# Patient Record
Sex: FEMALE | Race: WHITE | Employment: UNEMPLOYED | URBAN - METROPOLITAN AREA
[De-identification: names, ages, dates, MRNs, and addresses within clinical notes are randomized per-mention and may not be internally consistent; named-entity substitution may affect disease eponyms.]

---

## 2019-09-13 ENCOUNTER — APPOINTMENT (OUTPATIENT)
Dept: GENERAL RADIOLOGY | Age: 19
End: 2019-09-13
Payer: COMMERCIAL

## 2019-09-13 ENCOUNTER — HOSPITAL ENCOUNTER (EMERGENCY)
Age: 19
Discharge: HOME | End: 2019-09-13
Attending: EMERGENCY MEDICINE
Payer: COMMERCIAL

## 2019-09-13 VITALS
SYSTOLIC BLOOD PRESSURE: 120 MMHG | DIASTOLIC BLOOD PRESSURE: 79 MMHG | RESPIRATION RATE: 16 BRPM | HEART RATE: 95 BPM | TEMPERATURE: 97.7 F | OXYGEN SATURATION: 98 %

## 2019-09-13 DIAGNOSIS — S20.219A CONTUSION OF RIB, UNSPECIFIED LATERALITY, INITIAL ENCOUNTER: Primary | ICD-10-CM

## 2019-09-13 PROCEDURE — 71046 X-RAY EXAM CHEST 2 VIEWS: CPT

## 2019-09-13 PROCEDURE — 99283 EMERGENCY DEPT VISIT LOW MDM: CPT

## 2019-09-13 ASSESSMENT — PAIN SCALES - GENERAL: PAINLEVEL_OUTOF10: 8

## 2019-09-13 NOTE — ED PROVIDER NOTES
cyanosis, less than 3 sec capillary refill  HEENT:  Normocephalic, atraumatic. PERRL at 4-2. EOMI. Midface stable. Nares clear without septal hematoma or crusted blood. Oropharynx clear. No malocclusion. Neck:  Trachea midline  Pulmonary:   Lungs are clear to auscultation bilaterally. Easy work of breathing. + chest wall tenderness to right chest wall near sternum, no bruising noted of right breast . No signs of chest wall trauma. Cardiovascular:  Regular rate and rhythm. No murmurs, rubs or gallops. Abdomen:  Soft, nontender, nondistended. No focal masses. No rebound or guarding. No seatbelt sign, contusions, lacerations, abrasions or other signs of trauma. Musculoskeletal:  C-T-L spine non-tender in midline, no palpable step-offs. Chest wall without tenderness. Pelvis is stable. Extremities are warm and well-perfused. There is no bony tenderness of the upper or lower extremities. All four extremities are neurovascularly intact. Skin:  Warm and dry. Neuro: Moves all four extremities. SILT. GCS 15    Psych: Affect normal      Diagnostic Results     EKG   none    RADIOLOGY:  XR CHEST STANDARD (2 VW)   Final Result      Normal chest                LABS:   No results found for this visit on 09/13/19. ED BEDSIDE ULTRASOUND:  none    RECENT VITALS:  BP: 120/79,Temp: 97.7 °F (36.5 °C), Pulse: 95, Resp: 16, SpO2: 98 %     Procedures     none    ED Course     Nursing Notes, Past Medical Hx, Past Surgical Hx, Social Hx,Allergies, and Family Hx were reviewed. The patient was given the following medications:  No orders of the defined types were placed in this encounter. CONSULTS:  None    MEDICAL DECISIONMAKING / ASSESSMENT / Madhu Salazar is a 22 y.o. female who presents with a chief complaint of rib pain. Initial exam reveals a well-appearing female in no acute distress with normal vitals, afebrile.   Physical exam remarkable for tenderness to palpation over right chest wall and right breast

## 2019-09-13 NOTE — ED NOTES
Pt. D/c'd to home a/ox3, able to ambulate without difficulty. Pt. Alma Callahan understanding of d/c instructions.      Nader Cardoza RN  09/13/19 [id number]